# Patient Record
Sex: FEMALE | URBAN - METROPOLITAN AREA
[De-identification: names, ages, dates, MRNs, and addresses within clinical notes are randomized per-mention and may not be internally consistent; named-entity substitution may affect disease eponyms.]

---

## 2022-06-04 ENCOUNTER — TELEPHONE ENCOUNTER (OUTPATIENT)
Dept: URBAN - METROPOLITAN AREA CLINIC 68 | Facility: CLINIC | Age: 57
End: 2022-06-04

## 2022-06-05 ENCOUNTER — TELEPHONE ENCOUNTER (OUTPATIENT)
Dept: URBAN - METROPOLITAN AREA CLINIC 68 | Facility: CLINIC | Age: 57
End: 2022-06-05

## 2022-06-25 ENCOUNTER — TELEPHONE ENCOUNTER (OUTPATIENT)
Age: 57
End: 2022-06-25

## 2022-06-26 ENCOUNTER — TELEPHONE ENCOUNTER (OUTPATIENT)
Age: 57
End: 2022-06-26

## 2025-01-22 ENCOUNTER — DASHBOARD ENCOUNTERS (OUTPATIENT)
Age: 60
End: 2025-01-22

## 2025-01-22 ENCOUNTER — OFFICE VISIT (OUTPATIENT)
Dept: URBAN - METROPOLITAN AREA CLINIC 68 | Facility: CLINIC | Age: 60
End: 2025-01-22
Payer: COMMERCIAL

## 2025-01-22 ENCOUNTER — LAB OUTSIDE AN ENCOUNTER (OUTPATIENT)
Dept: URBAN - METROPOLITAN AREA CLINIC 68 | Facility: CLINIC | Age: 60
End: 2025-01-22

## 2025-01-22 VITALS — BODY MASS INDEX: 28.85 KG/M2 | WEIGHT: 169 LBS | HEIGHT: 64 IN

## 2025-01-22 DIAGNOSIS — K21.9 GASTROESOPHAGEAL REFLUX DISEASE WITHOUT ESOPHAGITIS: ICD-10-CM

## 2025-01-22 DIAGNOSIS — Z12.11 SCREENING FOR COLON CANCER: ICD-10-CM

## 2025-01-22 PROBLEM — 266435005: Status: ACTIVE | Noted: 2025-01-22

## 2025-01-22 PROCEDURE — 99204 OFFICE O/P NEW MOD 45 MIN: CPT | Performed by: INTERNAL MEDICINE

## 2025-01-22 RX ORDER — TRAZODONE HYDROCHLORIDE 100 MG/1
TAKE 1 TABLET BY MOUTH EVERY DAY AT BEDTIME TABLET ORAL
Qty: 90 EACH | Refills: 0 | Status: ACTIVE | COMMUNITY

## 2025-01-22 RX ORDER — IPRATROPIUM BROMIDE 42 UG/1
USE 1 SPRAY(S) IN EACH NOSTRIL THREE TIMES DAILY SPRAY, METERED NASAL
Qty: 30 MILLILITER | Refills: 0 | Status: ON HOLD | COMMUNITY

## 2025-01-22 RX ORDER — METFORMIN HYDROCHLORIDE 1000 MG/1
TAKE 1 TABLET BY MOUTH EVERY DAY TABLET, FILM COATED ORAL
Qty: 90 EACH | Refills: 0 | Status: ACTIVE | COMMUNITY

## 2025-01-22 RX ORDER — METFORMIN HYDROCHLORIDE 1000 MG/1
1 TABLET WITH A MEAL TABLET, FILM COATED ORAL ONCE A DAY
Qty: 30 | Status: ON HOLD | COMMUNITY
Start: 2025-01-22

## 2025-01-22 RX ORDER — EMPAGLIFLOZIN AND LINAGLIPTIN 25; 5 MG/1; MG/1
TAKE 1 TABLET BY MOUTH ONCE DAILY TABLET, FILM COATED ORAL
Qty: 90 EACH | Refills: 0 | Status: ACTIVE | COMMUNITY

## 2025-01-22 RX ORDER — LEVOTHYROXINE, LIOTHYRONINE 38; 9 UG/1; UG/1
TAKE 1 TABLET BY MOUTH ONCE DAILY IN THE MORNING ((INCREASING DOSE)) TABLET ORAL
Qty: 90 EACH | Refills: 3 | Status: ACTIVE | COMMUNITY

## 2025-01-22 RX ORDER — SEMAGLUTIDE 1.34 MG/ML
INJECT 1 MG SUBCUTANEOUSLY ONCE A WEEK INJECTION, SOLUTION SUBCUTANEOUS
Qty: 9 MILLILITER | Refills: 0 | Status: ON HOLD | COMMUNITY

## 2025-01-22 RX ORDER — SERTRALINE HYDROCHLORIDE 100 MG/1
TAKE 1 & 1/2 TABLETS BY MOUTH EVERY DAY TABLET, FILM COATED ORAL
Qty: 135 EACH | Refills: 2 | Status: ACTIVE | COMMUNITY

## 2025-01-22 RX ORDER — ROSUVASTATIN CALCIUM 20 MG/1
TAKE 1 TABLET BY MOUTH ONCE DAILY ((INCREASING DOSE)) TABLET, FILM COATED ORAL
Qty: 90 EACH | Refills: 1 | Status: ACTIVE | COMMUNITY

## 2025-01-22 NOTE — HPI-TODAY'S VISIT:
Patient for possible GERD. Referred having intermittent episodes of chocking during the night while sleeping. Referred previous diagnosed with a Hiatal hernia >10hyrs ago.  Denies nausea, vomits, dysphagia, odynophagia, abdominal pain, diarrhea, constipation GI bleeding or weight loss

## 2025-02-06 ENCOUNTER — CLAIMS CREATED FROM THE CLAIM WINDOW (OUTPATIENT)
Dept: URBAN - METROPOLITAN AREA SURGERY CENTER 12 | Facility: SURGERY CENTER | Age: 60
End: 2025-02-06
Payer: COMMERCIAL

## 2025-02-06 ENCOUNTER — CLAIMS CREATED FROM THE CLAIM WINDOW (OUTPATIENT)
Dept: URBAN - METROPOLITAN AREA CLINIC 4 | Facility: CLINIC | Age: 60
End: 2025-02-06
Payer: COMMERCIAL

## 2025-02-06 DIAGNOSIS — K29.70 CHRONIC GASTRITIS: ICD-10-CM

## 2025-02-06 DIAGNOSIS — K31.7 GASTRIC POLYPS: ICD-10-CM

## 2025-02-06 DIAGNOSIS — K29.70 GASTRITIS, UNSPECIFIED, WITHOUT BLEEDING: ICD-10-CM

## 2025-02-06 DIAGNOSIS — K31.89 OTHER DISEASES OF STOMACH AND DUODENUM: ICD-10-CM

## 2025-02-06 DIAGNOSIS — K29.60 OTHER GASTRITIS WITHOUT BLEEDING: ICD-10-CM

## 2025-02-06 DIAGNOSIS — K22.89 OTHER SPECIFIED DISEASE OF ESOPHAGUS: ICD-10-CM

## 2025-02-06 DIAGNOSIS — K31.7 FUNDIC GLAND POLYPOSIS OF STOMACH: ICD-10-CM

## 2025-02-06 PROCEDURE — 00731 ANES UPR GI NDSC PX NOS: CPT | Performed by: NURSE ANESTHETIST, CERTIFIED REGISTERED

## 2025-02-06 PROCEDURE — 43239 EGD BIOPSY SINGLE/MULTIPLE: CPT | Performed by: INTERNAL MEDICINE

## 2025-02-06 PROCEDURE — 88305 TISSUE EXAM BY PATHOLOGIST: CPT | Performed by: PATHOLOGY

## 2025-02-06 PROCEDURE — 88342 IMHCHEM/IMCYTCHM 1ST ANTB: CPT | Performed by: PATHOLOGY

## 2025-02-06 RX ORDER — IPRATROPIUM BROMIDE 42 UG/1
USE 1 SPRAY(S) IN EACH NOSTRIL THREE TIMES DAILY SPRAY, METERED NASAL
Qty: 30 MILLILITER | Refills: 0 | Status: ON HOLD | COMMUNITY

## 2025-02-06 RX ORDER — LEVOTHYROXINE, LIOTHYRONINE 38; 9 UG/1; UG/1
TAKE 1 TABLET BY MOUTH ONCE DAILY IN THE MORNING ((INCREASING DOSE)) TABLET ORAL
Qty: 90 EACH | Refills: 3 | Status: ACTIVE | COMMUNITY

## 2025-02-06 RX ORDER — ROSUVASTATIN CALCIUM 20 MG/1
TAKE 1 TABLET BY MOUTH ONCE DAILY ((INCREASING DOSE)) TABLET, FILM COATED ORAL
Qty: 90 EACH | Refills: 1 | Status: ACTIVE | COMMUNITY

## 2025-02-06 RX ORDER — SERTRALINE HYDROCHLORIDE 100 MG/1
TAKE 1 & 1/2 TABLETS BY MOUTH EVERY DAY TABLET, FILM COATED ORAL
Qty: 135 EACH | Refills: 2 | Status: ACTIVE | COMMUNITY

## 2025-02-06 RX ORDER — SEMAGLUTIDE 1.34 MG/ML
INJECT 1 MG SUBCUTANEOUSLY ONCE A WEEK INJECTION, SOLUTION SUBCUTANEOUS
Qty: 9 MILLILITER | Refills: 0 | Status: ON HOLD | COMMUNITY

## 2025-02-06 RX ORDER — EMPAGLIFLOZIN AND LINAGLIPTIN 25; 5 MG/1; MG/1
TAKE 1 TABLET BY MOUTH ONCE DAILY TABLET, FILM COATED ORAL
Qty: 90 EACH | Refills: 0 | Status: ACTIVE | COMMUNITY

## 2025-02-06 RX ORDER — METFORMIN HYDROCHLORIDE 1000 MG/1
TAKE 1 TABLET BY MOUTH EVERY DAY TABLET, FILM COATED ORAL
Qty: 90 EACH | Refills: 0 | Status: ACTIVE | COMMUNITY

## 2025-02-06 RX ORDER — TRAZODONE HYDROCHLORIDE 100 MG/1
TAKE 1 TABLET BY MOUTH EVERY DAY AT BEDTIME TABLET ORAL
Qty: 90 EACH | Refills: 0 | Status: ACTIVE | COMMUNITY

## 2025-02-06 RX ORDER — METFORMIN HYDROCHLORIDE 1000 MG/1
1 TABLET WITH A MEAL TABLET, FILM COATED ORAL ONCE A DAY
Qty: 30 | Status: ON HOLD | COMMUNITY
Start: 2025-01-22

## 2025-02-19 ENCOUNTER — OFFICE VISIT (OUTPATIENT)
Dept: URBAN - METROPOLITAN AREA CLINIC 68 | Facility: CLINIC | Age: 60
End: 2025-02-19

## 2025-03-19 ENCOUNTER — OFFICE VISIT (OUTPATIENT)
Dept: URBAN - METROPOLITAN AREA CLINIC 68 | Facility: CLINIC | Age: 60
End: 2025-03-19

## 2025-03-19 RX ORDER — SEMAGLUTIDE 1.34 MG/ML
INJECT 1 MG SUBCUTANEOUSLY ONCE A WEEK INJECTION, SOLUTION SUBCUTANEOUS
Qty: 9 MILLILITER | Refills: 0 | Status: ON HOLD | COMMUNITY

## 2025-03-19 RX ORDER — IPRATROPIUM BROMIDE 42 UG/1
USE 1 SPRAY(S) IN EACH NOSTRIL THREE TIMES DAILY SPRAY, METERED NASAL
Qty: 30 MILLILITER | Refills: 0 | Status: ON HOLD | COMMUNITY

## 2025-03-19 RX ORDER — ROSUVASTATIN CALCIUM 20 MG/1
TAKE 1 TABLET BY MOUTH ONCE DAILY ((INCREASING DOSE)) TABLET, FILM COATED ORAL
Qty: 90 EACH | Refills: 1 | Status: ACTIVE | COMMUNITY

## 2025-03-19 RX ORDER — EMPAGLIFLOZIN AND LINAGLIPTIN 25; 5 MG/1; MG/1
TAKE 1 TABLET BY MOUTH ONCE DAILY TABLET, FILM COATED ORAL
Qty: 90 EACH | Refills: 0 | Status: ACTIVE | COMMUNITY

## 2025-03-19 RX ORDER — TRAZODONE HYDROCHLORIDE 100 MG/1
TAKE 1 TABLET BY MOUTH EVERY DAY AT BEDTIME TABLET ORAL
Qty: 90 EACH | Refills: 0 | Status: ACTIVE | COMMUNITY

## 2025-03-19 RX ORDER — SERTRALINE HYDROCHLORIDE 100 MG/1
TAKE 1 & 1/2 TABLETS BY MOUTH EVERY DAY TABLET, FILM COATED ORAL
Qty: 135 EACH | Refills: 2 | Status: ACTIVE | COMMUNITY

## 2025-03-19 RX ORDER — METFORMIN HYDROCHLORIDE 1000 MG/1
TAKE 1 TABLET BY MOUTH EVERY DAY TABLET, FILM COATED ORAL
Qty: 90 EACH | Refills: 0 | Status: ACTIVE | COMMUNITY

## 2025-03-19 RX ORDER — LEVOTHYROXINE, LIOTHYRONINE 38; 9 UG/1; UG/1
TAKE 1 TABLET BY MOUTH ONCE DAILY IN THE MORNING ((INCREASING DOSE)) TABLET ORAL
Qty: 90 EACH | Refills: 3 | Status: ACTIVE | COMMUNITY

## 2025-03-19 RX ORDER — METFORMIN HYDROCHLORIDE 1000 MG/1
1 TABLET WITH A MEAL TABLET, FILM COATED ORAL ONCE A DAY
Qty: 30 | Status: ON HOLD | COMMUNITY
Start: 2025-01-22

## 2025-05-20 NOTE — PHYSICAL EXAM NEUROLOGIC:
Where Is Your Acne Located?: Face, chest and back oriented to person, place and time , normal sensation , short and long term memory intact